# Patient Record
Sex: MALE | Race: WHITE | NOT HISPANIC OR LATINO | Employment: FULL TIME | URBAN - METROPOLITAN AREA
[De-identification: names, ages, dates, MRNs, and addresses within clinical notes are randomized per-mention and may not be internally consistent; named-entity substitution may affect disease eponyms.]

---

## 2020-07-13 ENCOUNTER — OFFICE VISIT (OUTPATIENT)
Dept: FAMILY MEDICINE CLINIC | Facility: CLINIC | Age: 35
End: 2020-07-13

## 2020-07-13 VITALS
OXYGEN SATURATION: 98 % | WEIGHT: 204 LBS | DIASTOLIC BLOOD PRESSURE: 90 MMHG | HEART RATE: 79 BPM | TEMPERATURE: 98 F | HEIGHT: 67 IN | SYSTOLIC BLOOD PRESSURE: 152 MMHG | BODY MASS INDEX: 32.02 KG/M2

## 2020-07-13 DIAGNOSIS — Z00.00 WELLNESS EXAMINATION: Primary | ICD-10-CM

## 2020-07-13 PROCEDURE — 99385 PREV VISIT NEW AGE 18-39: CPT | Performed by: FAMILY MEDICINE

## 2020-07-13 NOTE — PROGRESS NOTES
"Subjective   Pedrito Naylor is a 34 y.o. male and is here for a comprehensive physical exam. The patient reports no problems.    Do you take any herbs or supplements that were not prescribed by a doctor? no  Are you taking calcium supplements? no  Are you taking aspirin daily? no    The following portions of the patient's history were reviewed and updated as appropriate: allergies, current medications, past family history, past medical history, past social history, past surgical history and problem list.    Review of Systems  Do you have pain that bothers you in your daily life? no  Pertinent items are noted in HPI.    Objective   /90 (BP Location: Left arm, Patient Position: Sitting, Cuff Size: Adult)   Pulse 79   Temp 98 °F (36.7 °C)   Ht 168.9 cm (66.5\")   Wt 92.5 kg (204 lb)   SpO2 98%   BMI 32.43 kg/m²   General appearance: alert, appears stated age and cooperative  Head: Normocephalic, without obvious abnormality, atraumatic  Eyes: conjunctivae/corneas clear. PERRL, EOM's intact. Fundi benign.  Ears: normal TM's and external ear canals both ears  Throat: lips, mucosa, and tongue normal; teeth and gums normal  Neck: no adenopathy, supple, symmetrical, trachea midline and thyroid not enlarged, symmetric, no tenderness/mass/nodules  Lungs: clear to auscultation bilaterally  Heart: regular rate and rhythm, S1, S2 normal, no murmur, click, rub or gallop  Abdomen: soft, non-tender; bowel sounds normal; no masses,  no organomegaly  Extremities: extremities normal, atraumatic, no cyanosis or edema  Skin: Skin color, texture, turgor normal. No rashes or lesions  Lymph nodes: Cervical, supraclavicular, and axillary nodes normal.  Neurologic: Grossly normal     No visits with results within 1 Week(s) from this visit.   Latest known visit with results is:   No results found for any previous visit.       Assessment/Plan   Healthy male exam.   There are no diagnoses linked to this encounter.  1. Wellness " exam  2. Patient Counseling:  --Nutrition: Stressed importance of moderation in sodium/caffeine intake, saturated fat and cholesterol, caloric balance, sufficient intake of fresh fruits, vegetables, fiber, calcium, iron, and 1 mg of folate supplement per day (for females capable of pregnancy).  --Exercise: Stressed the importance of regular exercise.   --Dental health: Discussed importance of regular tooth brushing, flossing, and dental visits.  --Immunizations reviewed.  --Discussed benefits of screening colonoscopy.    3. Discussed the patient's BMI with him.  The BMI is above average; BMI management plan is completed  4. Follow up in one year

## 2021-12-22 ENCOUNTER — OFFICE VISIT (OUTPATIENT)
Dept: FAMILY MEDICINE CLINIC | Facility: CLINIC | Age: 36
End: 2021-12-22

## 2021-12-22 VITALS
OXYGEN SATURATION: 98 % | DIASTOLIC BLOOD PRESSURE: 95 MMHG | WEIGHT: 220 LBS | SYSTOLIC BLOOD PRESSURE: 171 MMHG | TEMPERATURE: 97.3 F | BODY MASS INDEX: 34.98 KG/M2 | HEART RATE: 82 BPM

## 2021-12-22 DIAGNOSIS — S16.1XXD STRAIN OF NECK MUSCLE, SUBSEQUENT ENCOUNTER: Primary | ICD-10-CM

## 2021-12-22 DIAGNOSIS — R03.0 ELEVATED BLOOD PRESSURE READING: ICD-10-CM

## 2021-12-22 PROBLEM — S16.1XXA CERVICAL STRAIN: Status: ACTIVE | Noted: 2021-12-22

## 2021-12-22 PROCEDURE — 99213 OFFICE O/P EST LOW 20 MIN: CPT | Performed by: FAMILY MEDICINE

## 2021-12-22 RX ORDER — NAPROXEN 500 MG/1
TABLET ORAL
COMMUNITY
Start: 2021-12-16 | End: 2021-12-30

## 2021-12-22 RX ORDER — METHOCARBAMOL 500 MG/1
TABLET, FILM COATED ORAL
COMMUNITY
Start: 2021-12-16 | End: 2022-04-27

## 2021-12-22 RX ORDER — HYDROCODONE BITARTRATE AND ACETAMINOPHEN 5; 325 MG/1; MG/1
TABLET ORAL
COMMUNITY
Start: 2021-12-16 | End: 2022-04-27

## 2021-12-22 NOTE — ASSESSMENT & PLAN NOTE
Continue with current medications.  If not a lot better in a week then consider PT.  Tylenol 1-2 tabs po q4h prn
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
## 2022-01-24 ENCOUNTER — TREATMENT (OUTPATIENT)
Dept: PHYSICAL THERAPY | Facility: CLINIC | Age: 37
End: 2022-01-24

## 2022-01-24 DIAGNOSIS — M54.50 LOW BACK PAIN, UNSPECIFIED BACK PAIN LATERALITY, UNSPECIFIED CHRONICITY, UNSPECIFIED WHETHER SCIATICA PRESENT: ICD-10-CM

## 2022-01-24 DIAGNOSIS — M54.2 CERVICAL PAIN: ICD-10-CM

## 2022-01-24 DIAGNOSIS — S16.1XXD STRAIN OF NECK MUSCLE, SUBSEQUENT ENCOUNTER: Primary | ICD-10-CM

## 2022-01-24 PROCEDURE — 97162 PT EVAL MOD COMPLEX 30 MIN: CPT | Performed by: PHYSICAL THERAPIST

## 2022-01-24 PROCEDURE — 97110 THERAPEUTIC EXERCISES: CPT | Performed by: PHYSICAL THERAPIST
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
## 2022-01-26 ENCOUNTER — TREATMENT (OUTPATIENT)
Dept: PHYSICAL THERAPY | Facility: CLINIC | Age: 37
End: 2022-01-26

## 2022-01-26 DIAGNOSIS — S16.1XXD STRAIN OF NECK MUSCLE, SUBSEQUENT ENCOUNTER: Primary | ICD-10-CM

## 2022-01-26 DIAGNOSIS — M54.2 CERVICAL PAIN: ICD-10-CM

## 2022-01-26 DIAGNOSIS — M54.50 LOW BACK PAIN, UNSPECIFIED BACK PAIN LATERALITY, UNSPECIFIED CHRONICITY, UNSPECIFIED WHETHER SCIATICA PRESENT: ICD-10-CM

## 2022-01-26 PROCEDURE — 97140 MANUAL THERAPY 1/> REGIONS: CPT | Performed by: PHYSICAL THERAPIST

## 2022-01-26 PROCEDURE — 97110 THERAPEUTIC EXERCISES: CPT | Performed by: PHYSICAL THERAPIST

## 2022-01-26 PROCEDURE — 97530 THERAPEUTIC ACTIVITIES: CPT | Performed by: PHYSICAL THERAPIST

## 2022-01-26 NOTE — PROGRESS NOTES
"Physical Therapy Daily Treatment Note    Patient: Pedrito Naylor  : 1985  Referring Practitioner: Zulma Camacho MD  Date of Initial Visit: Type: THERAPY  Noted: 2022  Today's Date: 2022  Patient seen for: 2 sessions      Visit Diagnoses:     ICD-10-CM ICD-9-CM   1. Strain of neck muscle, subsequent encounter  S16.1XXD V58.89     847.0   2. Cervical pain  M54.2 723.1   3. Low back pain, unspecified back pain laterality, unspecified chronicity, unspecified whether sciatica present  M54.50 724.2       Subjective   Pedrito Naylor reports he is feeling OK, has been working on his stretches since eval. No significant relief to date, but does enjoy the lumbar rotation stretch the most. Does not use ice or heat for pain modulation or muscle relaxation.     Pain Level (0-10): 6/10 neck; 8/10 low back     Objective     See Exercise, Manual, and Modality Logs for complete treatment.     Patient Education: continue with current HEP, educated to use ice/heat as needed for further muscle relaxation with suggestion for ice following work and heat pre work.     Assessment & Plan     Assessment    Assessment details: Pedrito presents to therapy with pain as high as 8/10 in his low back and 6/10 in his neck. Initiated session with pt in prone for STM/MFR to thoracic and lumbar paraspinals, QL and superior gluts. Then positioned in supine for STM/MFR to cervical paraspinals, UT's and LS. Continued with initial HEP for gentle stretching and ROM. Initiated TrA contraction in supine and Sqap squeezes in sitting, not yet added to HEP. Several cues with each ex's for improved form to reduce stress and strain on back. Pt reporting \"muscle soreness\" following, but no increase or reduction in pain.         Progress per Plan of Care           Timed:         Manual Therapy:    24     mins  62028;     Therapeutic Exercise:    15     mins  47909;     Neuromuscular Tripp:        mins  10342;    Therapeutic Activity:     10     " mins  27966;     Gait Training:           mins  31496;     Ultrasound:          mins  11256;    Ionto                                   mins   33493  Self Care                            mins   26163      Un-Timed:  Electrical Stimulation:         mins  55445 ( );  Traction          mins 76878      Timed Treatment:   49   mins   Total Treatment:     49   mins      Jazzmine Hatfield PTA  Physical Therapist Assistant  [place] License: [id number]

## 2022-01-31 ENCOUNTER — TELEPHONE (OUTPATIENT)
Dept: FAMILY MEDICINE CLINIC | Facility: CLINIC | Age: 37
End: 2022-01-31

## 2022-01-31 DIAGNOSIS — G89.29 CHRONIC BILATERAL LOW BACK PAIN WITHOUT SCIATICA: Primary | ICD-10-CM

## 2022-01-31 DIAGNOSIS — M54.50 CHRONIC BILATERAL LOW BACK PAIN WITHOUT SCIATICA: Primary | ICD-10-CM

## 2022-01-31 NOTE — TELEPHONE ENCOUNTER
HE HAD A REFERRAL ON HIS NECK, BUT WAS ALSO WANTING ONE FOR HIS BACK. HE WANTS TO GO TO [facility] [place].

## 2022-02-01 ENCOUNTER — TREATMENT (OUTPATIENT)
Dept: PHYSICAL THERAPY | Facility: CLINIC | Age: 37
End: 2022-02-01

## 2022-02-01 DIAGNOSIS — M54.50 LOW BACK PAIN, UNSPECIFIED BACK PAIN LATERALITY, UNSPECIFIED CHRONICITY, UNSPECIFIED WHETHER SCIATICA PRESENT: ICD-10-CM

## 2022-02-01 DIAGNOSIS — M54.2 CERVICAL PAIN: ICD-10-CM

## 2022-02-01 DIAGNOSIS — S16.1XXD STRAIN OF NECK MUSCLE, SUBSEQUENT ENCOUNTER: Primary | ICD-10-CM

## 2022-02-01 PROCEDURE — 97140 MANUAL THERAPY 1/> REGIONS: CPT | Performed by: PHYSICAL THERAPIST

## 2022-02-01 PROCEDURE — 97530 THERAPEUTIC ACTIVITIES: CPT | Performed by: PHYSICAL THERAPIST

## 2022-02-01 NOTE — PROGRESS NOTES
Physical Therapy Daily Treatment Note    Patient: Pedrito Naylor  : 1985  Referring Practitioner: Zulma Camacho MD  Date of Initial Visit: Type: THERAPY  Noted: 2022  Today's Date: 2022  Patient seen for: 3 sessions      Visit Diagnoses:     ICD-10-CM ICD-9-CM   1. Strain of neck muscle, subsequent encounter  S16.1XXD V58.89     847.0   2. Cervical pain  M54.2 723.1   3. Low back pain, unspecified back pain laterality, unspecified chronicity, unspecified whether sciatica present  M54.50 724.2       Subjective   Pedrito Naylor reports: he is having more back pain than neck today. Neck is more sore, back is painful at 8/10 pain. He has yet to attempt any ice or heat at this point. Does voice that sleep is difficult, due to assisting spouse with  and not necessarily related to his back or neck pain. Stretches seem to be helping for short time.     Pain Level (0-10): 8 low back.     Objective     See Exercise, Manual, and Modality Logs for complete treatment.     Patient Education:figure 4 piriformis stretch in sitting, as well as flexion stretch to perform as able at work.     Assessment & Plan     Assessment    Assessment details: Focused session on low back today due to reports of increased pain in low back v. Soreness in neck. Addition of piriformis stretching and seated fwd flexion stretch to allow Pedrito to perform some HEP throughout his work day.       Progress strengthening /stabilization /functional activity           Timed:         Manual Therapy:    23     mins  91893;     Therapeutic Exercise:         mins  32418;     Neuromuscular Tripp:        mins  84972;    Therapeutic Activity:     15     mins  31465;     Gait Training:           mins  13415;     Ultrasound:          mins  68540;    Ionto                                   mins   28272  Self Care                            mins   84628      Un-Timed:  Electrical Stimulation:         mins  04394 ( );  Traction           mins 49941      Timed Treatment:   38   mins   Total Treatment:     38   mins      Jazzmine Hatfield PTA  Physical Therapist Assistant  [place] License: [id number]

## 2022-02-07 ENCOUNTER — TREATMENT (OUTPATIENT)
Dept: PHYSICAL THERAPY | Facility: CLINIC | Age: 37
End: 2022-02-07

## 2022-02-07 DIAGNOSIS — M54.2 CERVICAL PAIN: ICD-10-CM

## 2022-02-07 DIAGNOSIS — M54.50 LOW BACK PAIN, UNSPECIFIED BACK PAIN LATERALITY, UNSPECIFIED CHRONICITY, UNSPECIFIED WHETHER SCIATICA PRESENT: ICD-10-CM

## 2022-02-07 DIAGNOSIS — S16.1XXD STRAIN OF NECK MUSCLE, SUBSEQUENT ENCOUNTER: Primary | ICD-10-CM

## 2022-02-07 PROCEDURE — 97140 MANUAL THERAPY 1/> REGIONS: CPT | Performed by: PHYSICAL THERAPIST

## 2022-02-07 PROCEDURE — 97530 THERAPEUTIC ACTIVITIES: CPT | Performed by: PHYSICAL THERAPIST
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
## 2022-02-09 ENCOUNTER — TREATMENT (OUTPATIENT)
Dept: PHYSICAL THERAPY | Facility: CLINIC | Age: 37
End: 2022-02-09

## 2022-02-09 DIAGNOSIS — M54.2 CERVICAL PAIN: Primary | ICD-10-CM

## 2022-02-09 DIAGNOSIS — M54.50 LOW BACK PAIN, UNSPECIFIED BACK PAIN LATERALITY, UNSPECIFIED CHRONICITY, UNSPECIFIED WHETHER SCIATICA PRESENT: ICD-10-CM

## 2022-02-09 DIAGNOSIS — S16.1XXD STRAIN OF NECK MUSCLE, SUBSEQUENT ENCOUNTER: ICD-10-CM

## 2022-02-09 PROCEDURE — 97530 THERAPEUTIC ACTIVITIES: CPT | Performed by: PHYSICAL THERAPIST

## 2022-02-09 PROCEDURE — 97140 MANUAL THERAPY 1/> REGIONS: CPT | Performed by: PHYSICAL THERAPIST

## 2022-02-09 NOTE — PROGRESS NOTES
Physical Therapy Daily Treatment Note    Patient: Pedrito Naylor  : 1985  Referring Practitioner: Zulma Camacho MD  Date of Initial Visit: Type: THERAPY  Noted: 2022  Today's Date: 2022  Patient seen for: 5 sessions      Visit Diagnoses:     ICD-10-CM ICD-9-CM   1. Cervical pain  M54.2 723.1   2. Low back pain, unspecified back pain laterality, unspecified chronicity, unspecified whether sciatica present  M54.50 724.2   3. Strain of neck muscle, subsequent encounter  S16.1XXD V58.89     847.0       Subjective   Pedrito Naylor reports he was able to sleep pretty decently last night and is feeling a little better today. Feels as if the heat the other day helped relieve his pain. He did try ice the other day and feels as if heat is a bit better.     Pain Level (0-10): 6 in neck, 7 in low back.     Objective     See Exercise, Manual, and Modality Logs for complete treatment.     Patient Education: when to use heat v ice. Importance of rest for recovery from work and to aide in healing.     Assessment & Plan     Assessment    Assessment details: Pedrito reports to therapy with slight improvement in pain and discomfort, with indications that heat provided relief from pain. Again placed MHP to both neck and back throughout session for muscle relaxation and tissue extensibility when performing ex's. Reports some reduction in pain (5 in neck, 6 in low back). Consider holding manual at next session, progressing through therex first then ending with e-stim.        Progress per Plan of Care           Timed:         Manual Therapy:    15     mins  27790;     Therapeutic Exercise:         mins  34229;     Neuromuscular Tripp:        mins  35738;    Therapeutic Activity:     23     mins  89661;     Gait Training:           mins  67414;     Ultrasound:          mins  97619;    Ionto                                   mins   25891  Self Care                            mins   06886      Un-Timed:  Electrical  Stimulation:         mins  68705 ( );  Traction          mins 45603      Timed Treatment:   38   mins   Total Treatment:     38   mins      Jazzmine Hatfield PTA  Physical Therapist Assistant  [place] License: [id number]

## 2022-02-14 ENCOUNTER — TREATMENT (OUTPATIENT)
Dept: PHYSICAL THERAPY | Facility: CLINIC | Age: 37
End: 2022-02-14

## 2022-02-14 DIAGNOSIS — M54.2 CERVICAL PAIN: Primary | ICD-10-CM

## 2022-02-14 DIAGNOSIS — M54.50 LOW BACK PAIN, UNSPECIFIED BACK PAIN LATERALITY, UNSPECIFIED CHRONICITY, UNSPECIFIED WHETHER SCIATICA PRESENT: ICD-10-CM

## 2022-02-14 DIAGNOSIS — S16.1XXD STRAIN OF NECK MUSCLE, SUBSEQUENT ENCOUNTER: ICD-10-CM

## 2022-02-14 PROCEDURE — 97014 ELECTRIC STIMULATION THERAPY: CPT | Performed by: PHYSICAL THERAPIST

## 2022-02-14 PROCEDURE — 97530 THERAPEUTIC ACTIVITIES: CPT | Performed by: PHYSICAL THERAPIST

## 2022-02-14 PROCEDURE — 97110 THERAPEUTIC EXERCISES: CPT | Performed by: PHYSICAL THERAPIST

## 2022-02-14 NOTE — PROGRESS NOTES
Physical Therapy Daily Treatment Note    Patient: Pedrito Naylor  : 1985  Referring Practitioner: Zulma Camacho MD  Date of Initial Visit: Type: THERAPY  Noted: 2022  Today's Date: 2022  Patient seen for: 6 sessions      Visit Diagnoses:     ICD-10-CM ICD-9-CM   1. Cervical pain  M54.2 723.1   2. Low back pain, unspecified back pain laterality, unspecified chronicity, unspecified whether sciatica present  M54.50 724.2   3. Strain of neck muscle, subsequent encounter  S16.1XXD V58.89     847.0       Subjective   Pedrito Naylor reports he was off over the weekend, so he was able to rest some. His neck seemed to have improved to about 5/10 pain until they went to [place] with his kids. He states the next morning his neck was about 9/10 pain. Low back remains consistent at about 7-8/10.     Pain Level (0-10): 8 in neck and low back    Objective     See Exercise, Manual, and Modality Logs for complete treatment.     EDUCATION: Discussed home E-Stim unit    Assessment & Plan     Assessment    Assessment details: Pedrito reports to therapy with minimal relief in pain or discomfort overall. Manual techs for cervical spine performed, deferred for low back. Continued with therex as per flow sheet. Addition of e-stim to both neck and low back for pain modulation. Pt reporting pain decreased in low back to 6/10 and remains the same in his neck at about 7-8/10. Did enjoy the e-stim and would like to try again at a higher frequency at next session.        Progress per Plan of Care           Timed:         Manual Therapy:    15     mins  24927;     Therapeutic Exercise:         mins  12311;     Neuromuscular Tripp:        mins  58688;    Therapeutic Activity:     15     mins  00209;     Gait Training:           mins  35993;     Ultrasound:          mins  28333;    Ionto                                   mins   46854  Self Care                            mins   11748      Un-Timed:  Electrical  Stimulation:    15     mins  25091 ( );  Traction          mins 63952      Timed Treatment:   30   mins   Total Treatment:     45   mins      Jazzmine Hatfield PTA  Physical Therapist Assistant  [place] License: [id number]

## 2022-02-18 ENCOUNTER — TREATMENT (OUTPATIENT)
Dept: PHYSICAL THERAPY | Facility: CLINIC | Age: 37
End: 2022-02-18

## 2022-02-18 DIAGNOSIS — M54.50 LOW BACK PAIN, UNSPECIFIED BACK PAIN LATERALITY, UNSPECIFIED CHRONICITY, UNSPECIFIED WHETHER SCIATICA PRESENT: ICD-10-CM

## 2022-02-18 DIAGNOSIS — M54.2 CERVICAL PAIN: Primary | ICD-10-CM

## 2022-02-18 DIAGNOSIS — S16.1XXD STRAIN OF NECK MUSCLE, SUBSEQUENT ENCOUNTER: ICD-10-CM

## 2022-02-18 PROCEDURE — 97140 MANUAL THERAPY 1/> REGIONS: CPT | Performed by: PHYSICAL THERAPIST

## 2022-02-18 PROCEDURE — 97014 ELECTRIC STIMULATION THERAPY: CPT | Performed by: PHYSICAL THERAPIST

## 2022-02-18 PROCEDURE — 97530 THERAPEUTIC ACTIVITIES: CPT | Performed by: PHYSICAL THERAPIST

## 2022-02-18 NOTE — PROGRESS NOTES
Physical Therapy Daily Treatment Note    Patient: Pedrito Naylor  : 1985  Referring Practitioner: Zulma Camacho MD  Date of Initial Visit: Type: THERAPY  Noted: 2022  Today's Date: 2022  Patient seen for: 7 sessions      Visit Diagnoses:     ICD-10-CM ICD-9-CM   1. Cervical pain  M54.2 723.1   2. Low back pain, unspecified back pain laterality, unspecified chronicity, unspecified whether sciatica present  M54.50 724.2   3. Strain of neck muscle, subsequent encounter  S16.1XXD V58.89     847.0       Subjective   Pedrito Naylor reports he has been working solo, as his 2 co-workers are currently out. This has increased his general pain. Really enjoyed the e-stim at last session and has looked into a home TENS unit - though has not purchased as of yet.      Pain Level (0-10): 8 in neck and low back    Objective     See Exercise, Manual, and Modality Logs for complete treatment.     EDUCATION: Discussed home E-Stim unit    Assessment & Plan     Assessment    Assessment details: Continued with STM/MFR for muscle relaxation and pain modulation. Trigger points noted in ANITRA QL of low back, otherwise Pedrito reports tenderness in cervicoscapular musculature. Pedrito then performs therex, no cues required from therapist. Ended session with IFC to both neck and low back with MHP. Reports reduction in pain from 8/10 to 4/10 in both areas.        Progress per Plan of Care           Timed:         Manual Therapy:    26     mins  79026;     Therapeutic Exercise:         mins  58098;     Neuromuscular Tripp:        mins  49322;    Therapeutic Activity:     12     mins  87765;     Gait Training:           mins  15609;     Ultrasound:          mins  65744;    Ionto                                   mins   88539  Self Care                            mins   57471      Un-Timed:  Electrical Stimulation:    15     mins  16212 ( );  Traction          mins 19020      Timed Treatment:   38   mins   Total Treatment:      53   mins      Jazzmine Hatfield [facility]  Physical Therapist Assistant  [place] License: [id number]

## 2022-02-21 ENCOUNTER — TREATMENT (OUTPATIENT)
Dept: PHYSICAL THERAPY | Facility: CLINIC | Age: 37
End: 2022-02-21

## 2022-02-21 DIAGNOSIS — M54.2 CERVICAL PAIN: Primary | ICD-10-CM

## 2022-02-21 DIAGNOSIS — S16.1XXD STRAIN OF NECK MUSCLE, SUBSEQUENT ENCOUNTER: ICD-10-CM

## 2022-02-21 DIAGNOSIS — M54.50 LOW BACK PAIN, UNSPECIFIED BACK PAIN LATERALITY, UNSPECIFIED CHRONICITY, UNSPECIFIED WHETHER SCIATICA PRESENT: ICD-10-CM

## 2022-02-21 PROCEDURE — 97014 ELECTRIC STIMULATION THERAPY: CPT | Performed by: PHYSICAL THERAPIST

## 2022-02-21 PROCEDURE — 97140 MANUAL THERAPY 1/> REGIONS: CPT | Performed by: PHYSICAL THERAPIST

## 2022-02-21 PROCEDURE — 97530 THERAPEUTIC ACTIVITIES: CPT | Performed by: PHYSICAL THERAPIST

## 2022-02-21 NOTE — PROGRESS NOTES
Physical Therapy Daily Treatment Note    Patient: Pedrito Naylor  : 1985  Referring Practitioner: Zulma Camacho MD  Date of Initial Visit: Type: THERAPY  Noted: 2022  Today's Date: 2022  Patient seen for: 8 sessions      Visit Diagnoses:     ICD-10-CM ICD-9-CM   1. Cervical pain  M54.2 723.1   2. Low back pain, unspecified back pain laterality, unspecified chronicity, unspecified whether sciatica present  M54.50 724.2   3. Strain of neck muscle, subsequent encounter  S16.1XXD V58.89     847.0       Subjective   Pedrito Naylor reports nothing much has changed. Did have the weekend off and was able to perform some of his exercises. Does really like the ESTIM unit, reports relief for about 30 mins-1 hr.     Pain Level (0-10): 7 in neck and back.     Objective     See Exercise, Manual, and Modality Logs for complete treatment.     Patient Education: muscle tension and guarding in response to injury.    Assessment & Plan     Assessment    Assessment details: Continued with STM and gentle stretching. Addition of SL Open book for stretch of thoracolumbar spine and chest. Reports feeling a good stretch (R>L), and audible pops with first rotation to the L. Reports some improvement with this but nothing significant. Ended session with IFC and heat          Progress per Plan of Care           Timed:         Manual Therapy:    28     mins  44956;     Therapeutic Exercise:         mins  84444;     Neuromuscular Tripp:        mins  49035;    Therapeutic Activity:     10     mins  38940;     Gait Training:           mins  57947;     Ultrasound:          mins  27721;    Ionto                                   mins   64886  Self Care                            mins   64651      Un-Timed:  Electrical Stimulation:    15     mins  70276 ( );  Traction          mins 35290      Timed Treatment:  38    mins   Total Treatment:     53   mins      Jazzmine Hatfield PTA  Physical Therapist Assistant  [place] License:  [id number]

## 2022-02-25 ENCOUNTER — TREATMENT (OUTPATIENT)
Dept: PHYSICAL THERAPY | Facility: CLINIC | Age: 37
End: 2022-02-25

## 2022-02-25 DIAGNOSIS — M54.2 CERVICAL PAIN: Primary | ICD-10-CM

## 2022-02-25 DIAGNOSIS — S16.1XXD STRAIN OF NECK MUSCLE, SUBSEQUENT ENCOUNTER: ICD-10-CM

## 2022-02-25 DIAGNOSIS — M54.50 LOW BACK PAIN, UNSPECIFIED BACK PAIN LATERALITY, UNSPECIFIED CHRONICITY, UNSPECIFIED WHETHER SCIATICA PRESENT: ICD-10-CM

## 2022-02-25 PROCEDURE — 97014 ELECTRIC STIMULATION THERAPY: CPT | Performed by: PHYSICAL THERAPIST

## 2022-02-25 PROCEDURE — 97530 THERAPEUTIC ACTIVITIES: CPT | Performed by: PHYSICAL THERAPIST

## 2022-02-25 PROCEDURE — 97140 MANUAL THERAPY 1/> REGIONS: CPT | Performed by: PHYSICAL THERAPIST

## 2022-02-25 NOTE — PROGRESS NOTES
"Physical Therapy Daily Treatment Note    Patient: Pedrito Naylor  : 1985  Referring Practitioner: Zulma Camacho MD  Date of Initial Visit: Type: THERAPY  Noted: 2022  Today's Date: 2022  Patient seen for: 9 sessions      Visit Diagnoses:     ICD-10-CM ICD-9-CM   1. Cervical pain  M54.2 723.1   2. Low back pain, unspecified back pain laterality, unspecified chronicity, unspecified whether sciatica present  M54.50 724.2   3. Strain of neck muscle, subsequent encounter  S16.1XXD V58.89     847.0       Subjective   Pedrito Naylor reports he is doing pretty well today, attributes this to increased rest over the last several days. He is no longer \"solo\" at work, and has one work partner present, which has also helped.     Pain Level (0-10): 6 in neck and back.     Objective     See Exercise, Manual, and Modality Logs for complete treatment.       Assessment & Plan     Assessment    Assessment details: Pedrito presents to therapy with improving pain this session, attributes this to increased rest. Continued with STM/MFR of lumbar musculature, Noted tissue banding of R QL with reported tenderness/pain in this area. Banding is able to be reduced with STM, but not fully resolved. Also performed STM/MFR to posterior scapular and cervical musculature with less tension noted overall. Pedrito then performs exs prior to application of e-stim for further pain modulation and muscle relaxation. Reporting pain at 5/10 by end of session.          Progress per Plan of Care           Timed:         Manual Therapy:    28     mins  06078;     Therapeutic Exercise:         mins  86062;     Neuromuscular Tripp:        mins  70540;    Therapeutic Activity:     10     mins  96640;     Gait Training:           mins  61982;     Ultrasound:          mins  86445;    Ionto                                   mins   56724  Self Care                            mins   50818      Un-Timed:  Electrical Stimulation:    15     mins  22534 " ( );  Traction          mins 72354      Timed Treatment:  38    mins   Total Treatment:     53   mins      Jazzmine Hatfield PTA  Physical Therapist Assistant  [place] License: [id number]

## 2022-02-28 ENCOUNTER — TREATMENT (OUTPATIENT)
Dept: PHYSICAL THERAPY | Facility: CLINIC | Age: 37
End: 2022-02-28

## 2022-02-28 DIAGNOSIS — S16.1XXD STRAIN OF NECK MUSCLE, SUBSEQUENT ENCOUNTER: ICD-10-CM

## 2022-02-28 DIAGNOSIS — M54.2 CERVICAL PAIN: Primary | ICD-10-CM

## 2022-02-28 DIAGNOSIS — M54.50 LOW BACK PAIN, UNSPECIFIED BACK PAIN LATERALITY, UNSPECIFIED CHRONICITY, UNSPECIFIED WHETHER SCIATICA PRESENT: ICD-10-CM

## 2022-02-28 PROCEDURE — 97014 ELECTRIC STIMULATION THERAPY: CPT | Performed by: PHYSICAL THERAPIST

## 2022-02-28 PROCEDURE — 97140 MANUAL THERAPY 1/> REGIONS: CPT | Performed by: PHYSICAL THERAPIST

## 2022-02-28 PROCEDURE — 97110 THERAPEUTIC EXERCISES: CPT | Performed by: PHYSICAL THERAPIST
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
## 2022-03-01 ENCOUNTER — OFFICE VISIT (OUTPATIENT)
Dept: FAMILY MEDICINE CLINIC | Facility: CLINIC | Age: 37
End: 2022-03-01

## 2022-03-01 VITALS
DIASTOLIC BLOOD PRESSURE: 95 MMHG | HEART RATE: 75 BPM | HEIGHT: 68 IN | TEMPERATURE: 97.5 F | BODY MASS INDEX: 33.16 KG/M2 | OXYGEN SATURATION: 98 % | WEIGHT: 218.8 LBS | SYSTOLIC BLOOD PRESSURE: 141 MMHG

## 2022-03-01 DIAGNOSIS — G89.29 CHRONIC BILATERAL LOW BACK PAIN WITHOUT SCIATICA: Primary | ICD-10-CM

## 2022-03-01 DIAGNOSIS — M54.50 CHRONIC BILATERAL LOW BACK PAIN WITHOUT SCIATICA: Primary | ICD-10-CM

## 2022-03-01 DIAGNOSIS — S16.1XXD STRAIN OF NECK MUSCLE, SUBSEQUENT ENCOUNTER: ICD-10-CM

## 2022-03-01 PROCEDURE — 99213 OFFICE O/P EST LOW 20 MIN: CPT | Performed by: FAMILY MEDICINE

## 2022-03-01 NOTE — PROGRESS NOTES
"Chief Complaint  Neck Pain (follow up)    Subjective          Pedrito Naylor presents to [facility]  He has done PT for about 2 months.  He feels like the therapy has helped a little but he is still having pain.     Neck Pain   This is a chronic problem. The current episode started more than 1 month ago. The pain is present in the occipital region. The quality of the pain is described as aching. The pain is at a severity of 7/10. The symptoms are aggravated by position. Pertinent negatives include no chest pain, fever, headaches, leg pain, numbness, tingling or weakness. He has tried ice, home exercises, heat, NSAIDs and acetaminophen for the symptoms. The treatment provided mild relief.   Back Pain  This is a chronic problem. The current episode started more than 1 month ago. The problem occurs constantly. The problem is unchanged. The pain is present in the lumbar spine. The quality of the pain is described as aching. The pain does not radiate. The pain is at a severity of 7/10. The symptoms are aggravated by position. Pertinent negatives include no chest pain, fever, headaches, leg pain, numbness, tingling or weakness. He has tried NSAIDs, ice, heat and home exercises for the symptoms. The treatment provided mild relief.       Objective   Vital Signs:   /95 (BP Location: Left arm, Patient Position: Sitting, Cuff Size: Large Adult)   Pulse 75   Temp 97.5 °F (36.4 °C)   Ht 172.7 cm (68\")   Wt 99.2 kg (218 lb 12.8 oz)   SpO2 98%   BMI 33.27 kg/m²     Physical Exam  Vitals and nursing note reviewed.   Constitutional:       General: He is not in acute distress.     Appearance: He is well-developed.   HENT:      Head: Normocephalic.   Eyes:      General: Lids are normal.      Conjunctiva/sclera: Conjunctivae normal.   Neck:      Thyroid: No thyroid mass or thyromegaly.      Trachea: Trachea normal.   Cardiovascular:      Rate and Rhythm: Normal rate and regular rhythm.      " Heart sounds: Normal heart sounds.   Pulmonary:      Effort: Pulmonary effort is normal.      Breath sounds: Normal breath sounds.   Musculoskeletal:         General: Tenderness present.      Cervical back: Tenderness present. Decreased range of motion.      Lumbar back: Tenderness present. Decreased range of motion.   Lymphadenopathy:      Cervical: No cervical adenopathy.   Skin:     General: Skin is warm and dry.   Neurological:      General: No focal deficit present.      Mental Status: He is alert and oriented to person, place, and time. Mental status is at baseline.   Psychiatric:         Attention and Perception: He is attentive.         Mood and Affect: Mood normal.         Speech: Speech normal.         Behavior: Behavior normal.        Result Review :   The following data was reviewed by: Zulma Camacho MD on 03/01/2022:                Assessment and Plan    Diagnoses and all orders for this visit:    1. Chronic bilateral low back pain without sciatica (Primary)  Assessment & Plan:  Ibuprofen as needed or Tylenol 1-2 tabs po q4h prn      Orders:  -     MRI Lumbar Spine Without Contrast; Future    2. Strain of neck muscle, subsequent encounter  -     MRI Cervical Spine Without Contrast; Future      Follow Up   Return if symptoms worsen or fail to improve.  Patient was given instructions and counseling regarding his condition or for health maintenance advice. Please see specific information pulled into the AVS if appropriate.

## 2022-03-04 ENCOUNTER — TREATMENT (OUTPATIENT)
Dept: PHYSICAL THERAPY | Facility: CLINIC | Age: 37
End: 2022-03-04

## 2022-03-04 DIAGNOSIS — S16.1XXD STRAIN OF NECK MUSCLE, SUBSEQUENT ENCOUNTER: ICD-10-CM

## 2022-03-04 DIAGNOSIS — M54.50 LOW BACK PAIN, UNSPECIFIED BACK PAIN LATERALITY, UNSPECIFIED CHRONICITY, UNSPECIFIED WHETHER SCIATICA PRESENT: ICD-10-CM

## 2022-03-04 DIAGNOSIS — M54.2 CERVICAL PAIN: Primary | ICD-10-CM

## 2022-03-04 PROCEDURE — 97530 THERAPEUTIC ACTIVITIES: CPT | Performed by: PHYSICAL THERAPIST

## 2022-03-04 PROCEDURE — 97140 MANUAL THERAPY 1/> REGIONS: CPT | Performed by: PHYSICAL THERAPIST

## 2022-03-04 PROCEDURE — 97014 ELECTRIC STIMULATION THERAPY: CPT | Performed by: PHYSICAL THERAPIST

## 2022-03-04 NOTE — PROGRESS NOTES
Physical Therapy Daily Treatment Note    Patient: Pedrito Naylor  : 1985  Referring Practitioner: Zulma Camacho MD  Date of Initial Visit: Type: THERAPY  Noted: 2022  Today's Date: 3/4/2022  Patient seen for: 11 sessions      Visit Diagnoses:     ICD-10-CM ICD-9-CM   1. Cervical pain  M54.2 723.1   2. Low back pain, unspecified back pain laterality, unspecified chronicity, unspecified whether sciatica present  M54.50 724.2   3. Strain of neck muscle, subsequent encounter  S16.1XXD V58.89     847.0       Subjective   Pedrito Naylor reports that this has been a pretty rough week between home and work. He has been working overtime and has not been getting the best of rest at home due to several factors. He reports seeing Dr. Camacho earlier this week, she has ordered MRI's and told him to take PRN Ibuprofen/tylenol. He reports taking ibuprofen 1x/day before work, and not more.     Pain Level (0-10): Neck 7-8/10 in neck and back     Objective     See Exercise, Manual, and Modality Logs for complete treatment.       Education: discussed increasing use of NSAIDs per MD order of 1-2 tabs q4h for the next few days then tapering down as tolerated to see if this will aide in reducing pain cycle.     Assessment & Plan     Assessment    Assessment details: Pedrito continues to present with pain as high as 7-8/10 in low back and neck. Continued with STM/MFR to both areas and ended with e-stim as this provides most relief to patient.       Progress per Plan of Care         Timed:         Manual Therapy:    23     mins  80200;     Therapeutic Exercise:         mins  53725;     Neuromuscular Tripp:        mins  11947;    Therapeutic Activity:     10     mins  39074;     Gait Training:           mins  85373;     Ultrasound:          mins  94479;    Ionto                                   mins   94601  Self Care                            mins   82662      Un-Timed:  Electrical Stimulation:    15     mins  68388 (  );  Traction          mins 29356      Timed Treatment:  33    mins   Total Treatment:     48   mins      Jazzmine Hatfield PTA  Physical Therapist Assistant  [place] License: [id number]

## 2022-03-07 ENCOUNTER — TREATMENT (OUTPATIENT)
Dept: PHYSICAL THERAPY | Facility: CLINIC | Age: 37
End: 2022-03-07

## 2022-03-07 DIAGNOSIS — M54.2 CERVICAL PAIN: Primary | ICD-10-CM

## 2022-03-07 DIAGNOSIS — S16.1XXD STRAIN OF NECK MUSCLE, SUBSEQUENT ENCOUNTER: ICD-10-CM

## 2022-03-07 DIAGNOSIS — M54.50 LOW BACK PAIN, UNSPECIFIED BACK PAIN LATERALITY, UNSPECIFIED CHRONICITY, UNSPECIFIED WHETHER SCIATICA PRESENT: ICD-10-CM

## 2022-03-07 PROCEDURE — 97014 ELECTRIC STIMULATION THERAPY: CPT | Performed by: PHYSICAL THERAPIST

## 2022-03-07 PROCEDURE — 97530 THERAPEUTIC ACTIVITIES: CPT | Performed by: PHYSICAL THERAPIST

## 2022-03-07 PROCEDURE — 97140 MANUAL THERAPY 1/> REGIONS: CPT | Performed by: PHYSICAL THERAPIST

## 2022-03-07 NOTE — PROGRESS NOTES
Physical Therapy Daily Treatment Note    Patient: Pedrito Naylor  : 1985  Referring Practitioner: Zulma Camacho MD  Date of Initial Visit: Type: THERAPY  Noted: 2022  Today's Date: 3/7/2022  Patient seen for: 12 sessions      Visit Diagnoses:     ICD-10-CM ICD-9-CM   1. Cervical pain  M54.2 723.1   2. Low back pain, unspecified back pain laterality, unspecified chronicity, unspecified whether sciatica present  M54.50 724.2   3. Strain of neck muscle, subsequent encounter  S16.1XXD V58.89     847.0       Subjective   Pedrito Naylor reports that working all weekend was pretty hard, he wasn't able to rest as he would have liked, and is experiencing pain around 8/10 in both his neck and back. 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 Has been using NSAID more frequently per MD order. Voices relief of discomfort following each PT session, but only lasts ~1 hr before pain begins to increase.     Pain Level (0-10): Neck 8/10 in neck and back     Objective     See Exercise, Manual, and Modality Logs for complete treatment.       Education: continued use of NSAID's per MD Orders for pain modulation, rest and TENS.      Assessment & Plan     Assessment    Assessment details: Pedrito voices relief following each PT session, but nothing seems to give him long lasting results. STM, ex's and e-stim continued as these do provide positive results here in the clinic. Continued discussion of NSAID use and performing therex as able throughout the day. BRENDA has home TENS unit that pt plans to borrow and bring in for education on at home use.       Progress per Plan of Care         Timed:         Manual Therapy:    28     mins  26435;     Therapeutic Exercise:         mins  71055;     Neuromuscular Tripp:        mins  40899;    Therapeutic Activity:     23     mins  30489;     Gait Training:           mins  60412;      Ultrasound:          mins  07888;    Ionto                                   mins   60441  Self Care                            mins   88116      Un-Timed:  Electrical Stimulation:    15     mins  16256 ( );  Traction          mins 04522      Timed Treatment:  51    mins   Total Treatment:     66   mins      Jazzmine Hatfield PTA  Physical Therapist Assistant  [place] License: [id number]

## 2022-03-11 ENCOUNTER — TREATMENT (OUTPATIENT)
Dept: PHYSICAL THERAPY | Facility: CLINIC | Age: 37
End: 2022-03-11

## 2022-03-11 DIAGNOSIS — S16.1XXD STRAIN OF NECK MUSCLE, SUBSEQUENT ENCOUNTER: ICD-10-CM

## 2022-03-11 DIAGNOSIS — M54.50 LOW BACK PAIN, UNSPECIFIED BACK PAIN LATERALITY, UNSPECIFIED CHRONICITY, UNSPECIFIED WHETHER SCIATICA PRESENT: ICD-10-CM

## 2022-03-11 DIAGNOSIS — M54.2 CERVICAL PAIN: Primary | ICD-10-CM

## 2022-03-11 PROCEDURE — 97140 MANUAL THERAPY 1/> REGIONS: CPT | Performed by: PHYSICAL THERAPIST

## 2022-03-11 PROCEDURE — 97530 THERAPEUTIC ACTIVITIES: CPT | Performed by: PHYSICAL THERAPIST

## 2022-03-11 PROCEDURE — 97014 ELECTRIC STIMULATION THERAPY: CPT | Performed by: PHYSICAL THERAPIST

## 2022-03-11 NOTE — PROGRESS NOTES
Physical Therapy Daily Treatment Note    Patient: Pedrito Naylor  : 1985  Referring Practitioner: Zulma Camacho MD  Date of Initial Visit: Type: THERAPY  Noted: 2022  Today's Date: 3/11/2022  Patient seen for: 13 sessions      Visit Diagnoses:     ICD-10-CM ICD-9-CM   1. Cervical pain  M54.2 723.1   2. Low back pain, unspecified back pain laterality, unspecified chronicity, unspecified whether sciatica present  M54.50 724.2   3. Strain of neck muscle, subsequent encounter  S16.1XXD V58.89     847.0       Subjective   Pedrito Naylor reports that his back is not as bad as his neck, but he does remain pretty painful. Had to work last weekend and all week. Has had decent rest this weekend. Is taking NSAID every 4 hours as prescribed, but has not been able to taper down. Pain is worse on first wake and at end of his shift. Has MRI Scheduled on 3/30    Pain Level (0-10): Neck 7/10 in neck and 6/10 back     Objective     See Exercise, Manual, and Modality Logs for complete treatment.       Assessment & Plan     Assessment    Assessment details: Pedrito continues to have pain with minimal noted relief between sessions. Due to this, he has not been able to progress with therex further than gentle stretching. STM and e-stim continued as these do provide positive results here in the clinic. He demonstrates limited thoracic and scapular mobility, thus performed open book/bow & arrow to improve. Consider d/c of manual for several sessions with focus on mobility to see if this makes improvements.       Progress per Plan of Care         Timed:         Manual Therapy:    28     mins  96555;     Therapeutic Exercise:         mins  68949;     Neuromuscular Tripp:        mins  72820;    Therapeutic Activity:     10     mins  32106;     Gait Training:           mins  24999;     Ultrasound:          mins  29668;    Ionto                                   mins   80196  Self Care                            mins    90143      Un-Timed:  Electrical Stimulation:    15     mins  98658 ( );  Traction          mins 14093      Timed Treatment:  38    mins   Total Treatment:     53   mins      Jazzmine Hatfield PTA  Physical Therapist Assistant  [place] License: [id number]

## 2022-03-14 ENCOUNTER — TREATMENT (OUTPATIENT)
Dept: PHYSICAL THERAPY | Facility: CLINIC | Age: 37
End: 2022-03-14

## 2022-03-14 DIAGNOSIS — S16.1XXD STRAIN OF NECK MUSCLE, SUBSEQUENT ENCOUNTER: ICD-10-CM

## 2022-03-14 DIAGNOSIS — M54.50 LOW BACK PAIN, UNSPECIFIED BACK PAIN LATERALITY, UNSPECIFIED CHRONICITY, UNSPECIFIED WHETHER SCIATICA PRESENT: ICD-10-CM

## 2022-03-14 DIAGNOSIS — M54.2 CERVICAL PAIN: Primary | ICD-10-CM

## 2022-03-14 PROCEDURE — 97530 THERAPEUTIC ACTIVITIES: CPT | Performed by: PHYSICAL THERAPIST

## 2022-03-14 PROCEDURE — 97014 ELECTRIC STIMULATION THERAPY: CPT | Performed by: PHYSICAL THERAPIST

## 2022-03-14 PROCEDURE — 97110 THERAPEUTIC EXERCISES: CPT | Performed by: PHYSICAL THERAPIST

## 2022-03-14 NOTE — PROGRESS NOTES
Physical Therapy Daily Treatment Note    Patient: Pedrito Naylor  : 1985  Referring Practitioner: Zulma Camacho MD  Date of Initial Visit: Type: THERAPY  Noted: 2022  Today's Date: 3/14/2022  Patient seen for: 14 sessions      Visit Diagnoses:     ICD-10-CM ICD-9-CM   1. Cervical pain  M54.2 723.1   2. Low back pain, unspecified back pain laterality, unspecified chronicity, unspecified whether sciatica present  M54.50 724.2   3. Strain of neck muscle, subsequent encounter  S16.1XXD V58.89     847.0       Subjective   Pedrito Naylor reports that he was off work this weekend and was able to enjoy some leisure activities with his family. Attended [employer] and sat in bleacher style seating for some time. His back is a bit worse than his neck today (pain rating below).     Pain Level (0-10): Neck 5/10 in neck and 8/10 back     Objective     Observation in unsupported sitting is excessively rounded shoulders with increased kyphosis of thoracic spine, reduced lordosis of lumbar spine and significantly fwd flexed position of c-spine.    See Exercise, Manual, and Modality Logs for complete treatment.       Assessment & Plan     Assessment    Assessment details: Opted to hold manual techniques this session so pt can work through ex's for stretching/strengthening, to end with IFC/Heat;  Pt agreeable to plan. Performed current therex with additions this session of light T-Band shoulder and core strengthening. Pedrito requires significant instruction and cueing for posture and form during seated ex's for optimal strengthening. He reports reduction of pain in low back to 6/10 at end of session, but neck pain remains about the same.       Progress per Plan of Care         Timed:         Manual Therapy:         mins  91713;     Therapeutic Exercise:    23     mins  48002;     Neuromuscular Tripp:        mins  03396;    Therapeutic Activity:     15     mins  60825;     Gait Training:           mins  29132;      Ultrasound:          mins  69134;    Ionto                                   mins   63073  Self Care                            mins   78858      Un-Timed:  Electrical Stimulation:    15     mins  42171 ( );  Traction          mins 83153      Timed Treatment:  38    mins   Total Treatment:     53   mins      Jazzmine Hatfield PTA  Physical Therapist Assistant  [place] License: [id number]

## 2022-03-18 ENCOUNTER — TREATMENT (OUTPATIENT)
Dept: PHYSICAL THERAPY | Facility: CLINIC | Age: 37
End: 2022-03-18

## 2022-03-18 DIAGNOSIS — M54.50 LOW BACK PAIN, UNSPECIFIED BACK PAIN LATERALITY, UNSPECIFIED CHRONICITY, UNSPECIFIED WHETHER SCIATICA PRESENT: ICD-10-CM

## 2022-03-18 DIAGNOSIS — M54.2 CERVICAL PAIN: Primary | ICD-10-CM

## 2022-03-18 DIAGNOSIS — S16.1XXD STRAIN OF NECK MUSCLE, SUBSEQUENT ENCOUNTER: ICD-10-CM

## 2022-03-18 PROCEDURE — 97110 THERAPEUTIC EXERCISES: CPT | Performed by: PHYSICAL THERAPIST

## 2022-03-18 PROCEDURE — 97530 THERAPEUTIC ACTIVITIES: CPT | Performed by: PHYSICAL THERAPIST

## 2022-03-18 PROCEDURE — 97014 ELECTRIC STIMULATION THERAPY: CPT | Performed by: PHYSICAL THERAPIST

## 2022-03-18 NOTE — PROGRESS NOTES
Physical Therapy Daily Treatment Note    Patient: Pedrito Naylor  : 1985  Referring Practitioner: Zulma Camacho MD  Date of Initial Visit: Type: THERAPY  Noted: 2022  Today's Date: 3/18/2022  Patient seen for: 15 sessions      Visit Diagnoses:     ICD-10-CM ICD-9-CM   1. Cervical pain  M54.2 723.1   2. Low back pain, unspecified back pain laterality, unspecified chronicity, unspecified whether sciatica present  M54.50 724.2   3. Strain of neck muscle, subsequent encounter  S16.1XXD V58.89     847.0       Subjective   Pedrito Naylor reports: his pain remains between 6-8 in both his neck and low back. Voices some frustration that PT does seem to help for limited amount of time, but nothing seems to be long lasting. He is continuing to to take NSAIDs as prescribed q 4 hrs PRN. On a positive note, he does report that it is a bit easier to move. Feels as if the therex focused session was helpful earlier this week.      Pain Level (0-10): 7 in neck, 8 in back.     Objective     See Exercise, Manual, and Modality Logs for complete treatment.     Patient Education: Contacting MD and making appointment for MRI follow up after 3/30; possibly sooner if pain persists at 6-8 with no relief.     Assessment & Plan     Assessment    Assessment details: Continued this session with focus on movement. Prioritized gentle ROM and stretching of both back and neck with addition of light strengthening. Significant cueing for correct with TrA bracing/PPT and constant cues for postural awareness with all sitting ex's. Ended with IFC and heat, reporting relief in both neck and back- rating as follows: Neck 4/10, back 6/10      Progress per Plan of Care           Timed:         Manual Therapy:         mins  73936;     Therapeutic Exercise:    25     mins  04850;     Neuromuscular Tripp:        mins  36923;    Therapeutic Activity:     10     mins  79191;     Gait Training:           mins  04033;     Ultrasound:          mins   06657;    Ionto                                   mins   95000  Self Care                            mins   06846      Un-Timed:  Electrical Stimulation:    15     mins  27484 ( );  Traction          mins 55998      Timed Treatment:   35   mins   Total Treatment:     50   mins      Jazzmine Hatfield PTA  Physical Therapist Assistant  [place] License: [id number]

## 2022-03-21 ENCOUNTER — TREATMENT (OUTPATIENT)
Dept: PHYSICAL THERAPY | Facility: CLINIC | Age: 37
End: 2022-03-21

## 2022-03-21 DIAGNOSIS — M54.50 LOW BACK PAIN, UNSPECIFIED BACK PAIN LATERALITY, UNSPECIFIED CHRONICITY, UNSPECIFIED WHETHER SCIATICA PRESENT: ICD-10-CM

## 2022-03-21 DIAGNOSIS — S16.1XXD STRAIN OF NECK MUSCLE, SUBSEQUENT ENCOUNTER: ICD-10-CM

## 2022-03-21 DIAGNOSIS — M54.2 CERVICAL PAIN: Primary | ICD-10-CM

## 2022-03-21 PROCEDURE — 97530 THERAPEUTIC ACTIVITIES: CPT | Performed by: PHYSICAL THERAPIST

## 2022-03-21 PROCEDURE — 97014 ELECTRIC STIMULATION THERAPY: CPT | Performed by: PHYSICAL THERAPIST

## 2022-03-21 PROCEDURE — 97110 THERAPEUTIC EXERCISES: CPT | Performed by: PHYSICAL THERAPIST

## 2022-03-21 NOTE — PROGRESS NOTES
Physical Therapy Daily Treatment Note    Patient: Pedrito Naylor  : 1985  Referring Practitioner: Zulma Camacho MD  Date of Initial Visit: Type: THERAPY  Noted: 2022  Today's Date: 3/21/2022  Patient seen for: 16 sessions      Visit Diagnoses:     ICD-10-CM ICD-9-CM   1. Cervical pain  M54.2 723.1   2. Low back pain, unspecified back pain laterality, unspecified chronicity, unspecified whether sciatica present  M54.50 724.2   3. 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 Pain has reduced in his neck, but not in his low back.     Pain Level (0-10): 4 in neck, 7 low back     Objective     See Exercise, Manual, and Modality Logs for complete treatment.     Education: HEP updated with addition of Hip/Core strengthening and GTB given for anti-rotation ex's to perform at home     Assessment & Plan     Assessment    Assessment details: Focused this session on core engagement and strengthening as pt is reporting low levels of neck pain. Pedrito continues to have difficulty with abdominal engagement and dynamic mm coordination. Progressed as able with several verbal, tactile and visual cues. Ended session with IFC and heat to low back for pain modulation.       Progress per Plan of Care           Timed:         Manual Therapy:         mins  01315;     Therapeutic Exercise:    23     mins  11624;     Neuromuscular Tripp:        mins  55884;    Therapeutic Activity:     15     mins  07875;     Gait Training:           mins  53317;     Ultrasound:          mins  31025;    Ionto                                   mins   11635  Self Care                            mins   47096      Un-Timed:  Electrical Stimulation:    15     mins  39292 (MC );  Traction          mins 01813      Timed Treatment:   38   mins    Total Treatment:     53   mins      Jazzmine Hatfield PTA  Physical Therapist Assistant  [place] License: [id number]

## 2022-03-25 ENCOUNTER — TREATMENT (OUTPATIENT)
Dept: PHYSICAL THERAPY | Facility: CLINIC | Age: 37
End: 2022-03-25

## 2022-03-25 DIAGNOSIS — M54.2 CERVICAL PAIN: Primary | ICD-10-CM

## 2022-03-25 DIAGNOSIS — S16.1XXD STRAIN OF NECK MUSCLE, SUBSEQUENT ENCOUNTER: ICD-10-CM

## 2022-03-25 DIAGNOSIS — M54.50 LOW BACK PAIN, UNSPECIFIED BACK PAIN LATERALITY, UNSPECIFIED CHRONICITY, UNSPECIFIED WHETHER SCIATICA PRESENT: ICD-10-CM

## 2022-03-25 PROCEDURE — 97140 MANUAL THERAPY 1/> REGIONS: CPT | Performed by: PHYSICAL THERAPIST

## 2022-03-25 PROCEDURE — 97530 THERAPEUTIC ACTIVITIES: CPT | Performed by: PHYSICAL THERAPIST

## 2022-03-25 PROCEDURE — 97014 ELECTRIC STIMULATION THERAPY: CPT | Performed by: PHYSICAL THERAPIST

## 2022-03-25 NOTE — PROGRESS NOTES
Physical Therapy Daily Treatment Note    Patient: Pedrito Naylor  : 1985  Referring Practitioner: Zulma Camacho MD  Date of Initial Visit: Type: THERAPY  Noted: 2022  Today's Date: 3/25/2022  Patient seen for: 17 sessions      Visit Diagnoses:     ICD-10-CM ICD-9-CM   1. Cervical pain  M54.2 723.1   2. Low back pain, unspecified back pain laterality, unspecified chronicity, unspecified whether sciatica present  M54.50 724.2   3. Strain of neck muscle, subsequent encounter  S16.1XXD V58.89     847.0       Subjective   Pedrito Naylor reports: that his back has been hurting him pretty badly. Has been having sharp pains shooting down the R side since Tuesday. Noticed it at first when attempting to get up off of the floor from playing with his son. Reports that he did perform some stretches and ex's as well as heat with slight relief, but nothing significant. Currently neck pain has improved, back pain 9/10.     Pain Level (0-10): 9    Objective     See Exercise, Manual, and Modality Logs for complete treatment.     Patient Education: Defer strengthening at this time due to flair up of pain with radicular symptoms on R low back to leg. Is to focus on rest and stretching until next session.      Assessment & Plan     Assessment    Assessment details: Due to flair of pain and radicular symptoms STM/MFR resumed for R low back, glut, piriformis. Followed by gentle stretching and sciatic nerve glide- more time and cueing needed for additional ex's. Ended with IFC and heat with focus to R lumbosacral area in L sidelying. Reports improvement following, but not better than 7/10.           Progress per Plan of Care           Timed:         Manual Therapy:    23     mins  03406;     Therapeutic Exercise:         mins  23451;     Neuromuscular Tripp:        mins  59418;    Therapeutic Activity:     18     mins  34431;     Gait Training:           mins  34049;     Ultrasound:          mins  69180;    Ionto                                    mins   80558  Self Care                            mins   31569      Un-Timed:  Electrical Stimulation:    15     mins  22265 ( );  Traction          mins 51802      Timed Treatment:   41   mins   Total Treatment:     56   mins      Jazzmine Hatfield PTA  Physical Therapist Assistant  [place] License: [id number]

## 2022-03-28 ENCOUNTER — TREATMENT (OUTPATIENT)
Dept: PHYSICAL THERAPY | Facility: CLINIC | Age: 37
End: 2022-03-28

## 2022-03-28 DIAGNOSIS — M54.50 LOW BACK PAIN, UNSPECIFIED BACK PAIN LATERALITY, UNSPECIFIED CHRONICITY, UNSPECIFIED WHETHER SCIATICA PRESENT: ICD-10-CM

## 2022-03-28 DIAGNOSIS — S16.1XXD STRAIN OF NECK MUSCLE, SUBSEQUENT ENCOUNTER: ICD-10-CM

## 2022-03-28 DIAGNOSIS — M54.2 CERVICAL PAIN: Primary | ICD-10-CM

## 2022-03-28 PROCEDURE — 97140 MANUAL THERAPY 1/> REGIONS: CPT | Performed by: PHYSICAL THERAPIST

## 2022-03-28 PROCEDURE — 97014 ELECTRIC STIMULATION THERAPY: CPT | Performed by: PHYSICAL THERAPIST

## 2022-03-28 PROCEDURE — 97530 THERAPEUTIC ACTIVITIES: CPT | Performed by: PHYSICAL THERAPIST
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
## 2022-03-30 ENCOUNTER — APPOINTMENT (OUTPATIENT)
Dept: MRI IMAGING | Facility: HOSPITAL | Age: 37
End: 2022-03-30

## 2022-04-01 ENCOUNTER — TREATMENT (OUTPATIENT)
Dept: PHYSICAL THERAPY | Facility: CLINIC | Age: 37
End: 2022-04-01

## 2022-04-01 DIAGNOSIS — S16.1XXD STRAIN OF NECK MUSCLE, SUBSEQUENT ENCOUNTER: ICD-10-CM

## 2022-04-01 DIAGNOSIS — M54.2 CERVICAL PAIN: Primary | ICD-10-CM

## 2022-04-01 DIAGNOSIS — M54.50 LOW BACK PAIN, UNSPECIFIED BACK PAIN LATERALITY, UNSPECIFIED CHRONICITY, UNSPECIFIED WHETHER SCIATICA PRESENT: ICD-10-CM

## 2022-04-01 PROCEDURE — 97110 THERAPEUTIC EXERCISES: CPT | Performed by: PHYSICAL THERAPIST

## 2022-04-01 PROCEDURE — 97014 ELECTRIC STIMULATION THERAPY: CPT | Performed by: PHYSICAL THERAPIST

## 2022-04-01 PROCEDURE — 97140 MANUAL THERAPY 1/> REGIONS: CPT | Performed by: PHYSICAL THERAPIST

## 2022-04-01 NOTE — PROGRESS NOTES
Physical Therapy Daily Treatment Note    Patient: Pedrito Naylor  : 1985  Referring Practitioner: Zulma Camacho MD  Date of Initial Visit: Type: THERAPY  Noted: 2022  Today's Date: 2022  Patient seen for: 19 sessions      Visit Diagnoses:     ICD-10-CM ICD-9-CM   1. Cervical pain  M54.2 723.1   2. Strain of neck muscle, subsequent encounter  S16.1XXD V58.89     847.0   3. Low back pain, unspecified back pain laterality, unspecified chronicity, unspecified whether sciatica present  M54.50 724.2       Subjective   Pedrito Naylor reports that he is very frustrated with [facility] in regards to his scheduled MRI that was cancelled last minute. Feels as if he was given the run around because he was first told the Radiologist was not available to read the results, then it was that he didn't have Insurance Auth. He has reached out to his PCP regarding this and is still waiting on a resolution. He expresses frustration with the central scheduling hub when he reached out to the PCP office as well.      In regards to his pain, his low back remains unchanged, his neck continues to do well.     Pain Level (0-10): 8 low back, neck 1-2    Objective     See Exercise, Manual, and Modality Logs for complete treatment.     Patient Education: How to get through    Assessment & Plan     Assessment    Assessment details: Continued with STM/MFR and gentle stretches. IFC is reported to be the most beneficial for for pain relief. Thus ended with heat and IFC. Pain reported at 6/10 at end of session.       Progress per Plan of Care           Timed:         Manual Therapy:    10     mins  99889;     Therapeutic Exercise:    15     mins  62338;     Neuromuscular Tripp:        mins  97407;    Therapeutic Activity:          mins  56128;     Gait Training:           mins  80232;     Ultrasound:          mins  51092;    Ionto                                   mins   72482  Self Care                            mins    38688      Un-Timed:  Electrical Stimulation:    15     mins  62167 ( );  Traction          mins 62400      Timed Treatment:  35    mins   Total Treatment:     40   mins      Jazzmine Hatfield PTA  Physical Therapist Assistant  [place] License: [id number]

## 2022-04-04 ENCOUNTER — TREATMENT (OUTPATIENT)
Dept: PHYSICAL THERAPY | Facility: CLINIC | Age: 37
End: 2022-04-04

## 2022-04-04 DIAGNOSIS — S16.1XXD STRAIN OF NECK MUSCLE, SUBSEQUENT ENCOUNTER: ICD-10-CM

## 2022-04-04 DIAGNOSIS — M54.2 CERVICAL PAIN: Primary | ICD-10-CM

## 2022-04-04 DIAGNOSIS — M54.50 LOW BACK PAIN, UNSPECIFIED BACK PAIN LATERALITY, UNSPECIFIED CHRONICITY, UNSPECIFIED WHETHER SCIATICA PRESENT: ICD-10-CM

## 2022-04-04 PROCEDURE — 97014 ELECTRIC STIMULATION THERAPY: CPT | Performed by: PHYSICAL THERAPIST

## 2022-04-04 PROCEDURE — 97110 THERAPEUTIC EXERCISES: CPT | Performed by: PHYSICAL THERAPIST

## 2022-04-04 PROCEDURE — 97530 THERAPEUTIC ACTIVITIES: CPT | Performed by: PHYSICAL THERAPIST

## 2022-04-04 PROCEDURE — 97112 NEUROMUSCULAR REEDUCATION: CPT | Performed by: PHYSICAL THERAPIST

## 2022-04-04 NOTE — PROGRESS NOTES
Physical Therapy Daily Treatment Note    Patient: Pedrito Naylor  : 1985  Referring Practitioner: Zulma Camacho MD  Date of Initial Visit: Type: THERAPY  Noted: 2022  Today's Date: 2022  Patient seen for: 20 sessions      Visit Diagnoses:     ICD-10-CM ICD-9-CM   1. Cervical pain  M54.2 723.1   2. Strain of neck muscle, subsequent encounter  S16.1XXD V58.89     847.0   3. Low back pain, unspecified back pain laterality, unspecified chronicity, unspecified whether sciatica present  M54.50 724.2       Subjective   Pedrito Naylor reports that he is waiting on a call back from Dr. Camacho's office to determine if he is able to get an earlier MRI scheduled at either [facility] or [facility]. His neck continues to do well with minimal pain at this point, his back seems to remain in constant pain. Was mostly a 6/10 over the weekend, but is a 7/10 this morning due to lack of rest overnight.     Pain Level (0-10): 7/10 low back.     Objective     See Exercise, Manual, and Modality Logs for complete treatment.     Assessment & Plan     Assessment    Assessment details: Continued to progress through stretching and strengthening ex's for low back pain and discomfort. Continues to struggle with motor coordination and control of PPT/TrA contraction. Requires several cues for form. Ended with IFC and Heat with reported reduction in pain, but does not rate.       Progress per Plan of Care           Timed:         Manual Therapy:         mins  18502;     Therapeutic Exercise:    15     mins  92370;     Neuromuscular Tripp:    8    mins  24106;    Therapeutic Activity:     15     mins  78575;     Gait Training:           mins  25451;     Ultrasound:          mins  35292;    Ionto                                   mins   35474  Self Care                            mins   04684      Un-Timed:  Electrical Stimulation:    15     mins  49747 (MC );  Traction          mins 56028      Timed Treatment:  38    mins   Total  Treatment:     53   mins      Jazzmine Hatfield PTA  Physical Therapist Assistant  [place] License: [id number]

## 2022-04-08 ENCOUNTER — TREATMENT (OUTPATIENT)
Dept: PHYSICAL THERAPY | Facility: CLINIC | Age: 37
End: 2022-04-08

## 2022-04-08 DIAGNOSIS — M54.2 CERVICAL PAIN: ICD-10-CM

## 2022-04-08 DIAGNOSIS — S16.1XXD STRAIN OF NECK MUSCLE, SUBSEQUENT ENCOUNTER: ICD-10-CM

## 2022-04-08 DIAGNOSIS — M54.50 LOW BACK PAIN, UNSPECIFIED BACK PAIN LATERALITY, UNSPECIFIED CHRONICITY, UNSPECIFIED WHETHER SCIATICA PRESENT: Primary | ICD-10-CM

## 2022-04-08 PROCEDURE — 97530 THERAPEUTIC ACTIVITIES: CPT | Performed by: PHYSICAL THERAPIST

## 2022-04-08 PROCEDURE — 97014 ELECTRIC STIMULATION THERAPY: CPT | Performed by: PHYSICAL THERAPIST

## 2022-04-08 PROCEDURE — 97110 THERAPEUTIC EXERCISES: CPT | Performed by: PHYSICAL THERAPIST

## 2022-04-08 NOTE — PROGRESS NOTES
Physical Therapy Daily Treatment Note    Patient: Pedrito Naylor  : 1985  Referring Practitioner: Zulma Camacho MD  Date of Initial Visit: Type: THERAPY  Noted: 2022  Today's Date: 2022  Patient seen for: 21 sessions      Visit Diagnoses:     ICD-10-CM ICD-9-CM   1. Low back pain, unspecified back pain laterality, unspecified chronicity, unspecified whether sciatica present  M54.50 724.2       Subjective   Pedrito Naylor reports that he is still waiting on information regarding his ordered MRI's, he is hoping this will be resolved soon and he can get in for them. He is off all weekend and is hopeful to get some rest as well.     Pain Level (0-10): 7/10 low back.     Objective     See Exercise, Manual, and Modality Logs for complete treatment.     Assessment & Plan     Assessment    Assessment details: Pedrito presents to therapy with no reports of significant change or improvement in his low back pain. Neck pain remains low and not an issue at this time. Reports pain seems to be consistent on the R side in the area of QL, superior glut. Worked through therex as per flow sheet with addition of QP thread the needle into open book to encourage thoracic rotation with lumbar stretch to chest stretch. Also added mitali pose with slight left lateral reach, again to open R lateral spine and allow for lower lumbar stretch. Ended with IFC and heat as this seems to provide the most relief.       Progress per Plan of Care           Timed:         Manual Therapy:         mins  69290;     Therapeutic Exercise:    15     mins  63165;     Neuromuscular Tripp:        mins  58086;    Therapeutic Activity:     23     mins  26319;     Gait Training:           mins  46183;     Ultrasound:          mins  88703;    Ionto                                   mins   52619  Self Care                            mins   62908      Un-Timed:  Electrical Stimulation:    15     mins  72950 ( );  Traction          mins  02262      Timed Treatment:  38    mins   Total Treatment:     55   mins      Jazzmine Hatfield PTA  Physical Therapist Assistant  [place] License: [id number]

## 2022-04-11 ENCOUNTER — TREATMENT (OUTPATIENT)
Dept: PHYSICAL THERAPY | Facility: CLINIC | Age: 37
End: 2022-04-11

## 2022-04-11 DIAGNOSIS — S16.1XXD STRAIN OF NECK MUSCLE, SUBSEQUENT ENCOUNTER: ICD-10-CM

## 2022-04-11 DIAGNOSIS — M54.50 LOW BACK PAIN, UNSPECIFIED BACK PAIN LATERALITY, UNSPECIFIED CHRONICITY, UNSPECIFIED WHETHER SCIATICA PRESENT: Primary | ICD-10-CM

## 2022-04-11 DIAGNOSIS — M54.2 CERVICAL PAIN: ICD-10-CM

## 2022-04-11 PROCEDURE — 97530 THERAPEUTIC ACTIVITIES: CPT | Performed by: PHYSICAL THERAPIST

## 2022-04-11 PROCEDURE — 97110 THERAPEUTIC EXERCISES: CPT | Performed by: PHYSICAL THERAPIST

## 2022-04-11 PROCEDURE — 97014 ELECTRIC STIMULATION THERAPY: CPT | Performed by: PHYSICAL THERAPIST

## 2022-04-11 NOTE — PROGRESS NOTES
Physical Therapy Daily Treatment Note    Patient: Pedrito Naylor  : 1985  Referring Practitioner: Zulma Camacho MD  Date of Initial Visit: Type: THERAPY  Noted: 2022  Today's Date: 2022  Patient seen for: 22 sessions      Visit Diagnoses:     ICD-10-CM ICD-9-CM   1. Low back pain, unspecified back pain laterality, unspecified chronicity, unspecified whether sciatica present  M54.50 724.2   2. Cervical pain  M54.2 723.1   3. Strain of neck muscle, subsequent encounter  S16.1XXD V58.89     847.0       Subjective   Pedrito Naylor reports: that he was able to get out this weekend for shopping and spent most of the day yesterday outside with his kids. Neck pain remains low and not an issue, back pain remains moderate at 6-8/10. He is still utilizing oral NSAIDS Q4 per MD orders at last appointment, and has incorporated a hot pack as part of his nightly routine, which seems to have helped.     Pain Level (0-10): 6    Objective     See Exercise, Manual, and Modality Logs for complete treatment.     Patient Education: Discussed progress in PT, and need to increase strengthening exercises. Exercise mechanics.      Assessment & Plan     Assessment    Assessment details: Pedrito presented to therapy this date with continued complaints of of low back pain. Discussed progress in PT and the need to increase strengthening exercise. Spent time to educate proper form with PPT exercise. Visual and tactile cues given to keep correct form. Continued current exercises to encourage strength of lumbar stabilizers. Ended session with MHP and estim per patient preference.           Progress per Plan of Care and Progress strengthening /stabilization /functional activity -    See x 2 more sessions, 30 Day progress note on . Possible continuation v. return to MD for further evaluation v. DC due to lack of progress/conservation of insurance therapy coverage.            Timed:         Manual Therapy:         mins   90822;     Therapeutic Exercise:    15     mins  22560;     Neuromuscular Tripp:        mins  34806;    Therapeutic Activity:     20     mins  23814;     Gait Training:           mins  60292;     Ultrasound:          mins  90461;    Ionto                                   mins   79735  Self Care                            mins   05035      Un-Timed:  Electrical Stimulation:    15     mins  51598 ( );  Traction          mins 96587      Timed Treatment:   35   mins   Total Treatment:     50   mins      Jazzmine Hatfield PTA  Physical Therapist Assistant  [place] License: [id number]

## 2022-04-14 ENCOUNTER — TELEPHONE (OUTPATIENT)
Dept: FAMILY MEDICINE CLINIC | Facility: CLINIC | Age: 37
End: 2022-04-14

## 2022-04-14 DIAGNOSIS — G89.29 CHRONIC BILATERAL LOW BACK PAIN WITHOUT SCIATICA: Primary | ICD-10-CM

## 2022-04-14 DIAGNOSIS — M54.50 CHRONIC BILATERAL LOW BACK PAIN WITHOUT SCIATICA: Primary | ICD-10-CM

## 2022-04-15 ENCOUNTER — TREATMENT (OUTPATIENT)
Dept: PHYSICAL THERAPY | Facility: CLINIC | Age: 37
End: 2022-04-15

## 2022-04-15 DIAGNOSIS — M54.50 LOW BACK PAIN, UNSPECIFIED BACK PAIN LATERALITY, UNSPECIFIED CHRONICITY, UNSPECIFIED WHETHER SCIATICA PRESENT: Primary | ICD-10-CM

## 2022-04-15 DIAGNOSIS — S16.1XXD STRAIN OF NECK MUSCLE, SUBSEQUENT ENCOUNTER: ICD-10-CM

## 2022-04-15 DIAGNOSIS — M54.2 CERVICAL PAIN: ICD-10-CM

## 2022-04-15 PROCEDURE — 97530 THERAPEUTIC ACTIVITIES: CPT | Performed by: PHYSICAL THERAPIST

## 2022-04-15 PROCEDURE — 97014 ELECTRIC STIMULATION THERAPY: CPT | Performed by: PHYSICAL THERAPIST

## 2022-04-15 PROCEDURE — 97110 THERAPEUTIC EXERCISES: CPT | Performed by: PHYSICAL THERAPIST

## 2022-04-15 NOTE — PROGRESS NOTES
Physical Therapy Daily Treatment Note    Patient: Pedrito Naylor  : 1985  Referring Practitioner: Zulma Camacho MD  Date of Initial Visit: Type: THERAPY  Noted: 2022  Today's Date: 4/15/2022  Patient seen for: 23 sessions      Visit Diagnoses:     ICD-10-CM ICD-9-CM   1. Low back pain, unspecified back pain laterality, unspecified chronicity, unspecified whether sciatica present  M54.50 724.2   2. Cervical pain  M54.2 723.1   3. Strain of neck muscle, subsequent encounter  S16.1XXD V58.89     847.0       Subjective   Pedrito Naylor reports: that he is doing OK today, he has been off of work since yesterday. He has been continuing with heat, which has helped and has reduced the amount of ibuprofen he has been taking. Says he is mostly fatigued from managing his kids while his wife was out of town. Says he has been in contact with his insurance and MD to determine approval for MRI and yearly PT visit cap.    Pain Level (0-10): 5-6 low back     Objective     See Exercise, Manual, and Modality Logs for complete treatment.     Patient Education: Discussed progress in PT, and need to increase strengthening exercises. Exercise mechanics.      Assessment & Plan     Assessment    Assessment details: Pedrito presents to therapy with reports of slight reduction in pain overall. He demonstrates improving mechanics with PPT/TrA contraction throughout session, however intermittent cues for form are required. Endurance is limited and core stability is only able to maintained for 1 rep max with dynamic core strengthening.       Progress per Plan of Care and Progress strengthening /stabilization /functional activity     See x 1 more session, 30 Day progress note on . Possible continuation v. return to MD for further evaluation v. DC due to lack of progress/conservation of insurance therapy coverage.            Timed:         Manual Therapy:         mins  15801;     Therapeutic Exercise:    23     mins  87159;      Neuromuscular Tripp:        mins  12607;    Therapeutic Activity:     15     mins  90521;     Gait Training:           mins  07475;     Ultrasound:          mins  44754;    Ionto                                   mins   35886  Self Care                            mins   88271      Un-Timed:  Electrical Stimulation:    15     mins  76406 ( );  Traction          mins 75468      Timed Treatment:   38   mins   Total Treatment:     53   mins      Jazzmine Hatfield PTA  Physical Therapist Assistant  [place] License: [id number]

## 2022-04-18 ENCOUNTER — TREATMENT (OUTPATIENT)
Dept: PHYSICAL THERAPY | Facility: CLINIC | Age: 37
End: 2022-04-18

## 2022-04-18 DIAGNOSIS — M54.2 CERVICAL PAIN: ICD-10-CM

## 2022-04-18 DIAGNOSIS — M54.50 LOW BACK PAIN, UNSPECIFIED BACK PAIN LATERALITY, UNSPECIFIED CHRONICITY, UNSPECIFIED WHETHER SCIATICA PRESENT: Primary | ICD-10-CM

## 2022-04-18 PROCEDURE — 97110 THERAPEUTIC EXERCISES: CPT | Performed by: PHYSICAL THERAPIST

## 2022-04-18 PROCEDURE — 97530 THERAPEUTIC ACTIVITIES: CPT | Performed by: PHYSICAL THERAPIST

## 2022-04-18 PROCEDURE — 97014 ELECTRIC STIMULATION THERAPY: CPT | Performed by: PHYSICAL THERAPIST
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
## 2022-04-19 ENCOUNTER — TELEPHONE (OUTPATIENT)
Dept: FAMILY MEDICINE CLINIC | Facility: CLINIC | Age: 37
End: 2022-04-19

## 2022-04-19 DIAGNOSIS — M54.50 CHRONIC BILATERAL LOW BACK PAIN WITHOUT SCIATICA: Primary | ICD-10-CM

## 2022-04-19 DIAGNOSIS — G89.29 CHRONIC BILATERAL LOW BACK PAIN WITHOUT SCIATICA: Primary | ICD-10-CM

## 2022-04-26 ENCOUNTER — APPOINTMENT (OUTPATIENT)
Dept: MRI IMAGING | Facility: HOSPITAL | Age: 37
End: 2022-04-26
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
## 2022-04-27 ENCOUNTER — OFFICE VISIT (OUTPATIENT)
Dept: NEUROSURGERY | Facility: CLINIC | Age: 37
End: 2022-04-27

## 2022-04-27 ENCOUNTER — HOSPITAL ENCOUNTER (OUTPATIENT)
Dept: GENERAL RADIOLOGY | Facility: HOSPITAL | Age: 37
Discharge: HOME | End: 2022-04-27
Admitting: NURSE PRACTITIONER

## 2022-04-27 VITALS
WEIGHT: 212 LBS | SYSTOLIC BLOOD PRESSURE: 148 MMHG | HEART RATE: 64 BPM | OXYGEN SATURATION: 99 % | BODY MASS INDEX: 32.13 KG/M2 | TEMPERATURE: 97.5 F | DIASTOLIC BLOOD PRESSURE: 92 MMHG | RESPIRATION RATE: 16 BRPM | HEIGHT: 68 IN

## 2022-04-27 DIAGNOSIS — G89.29 CHRONIC RIGHT-SIDED LOW BACK PAIN WITHOUT SCIATICA: ICD-10-CM

## 2022-04-27 DIAGNOSIS — M54.50 CHRONIC RIGHT-SIDED LOW BACK PAIN WITHOUT SCIATICA: ICD-10-CM

## 2022-04-27 DIAGNOSIS — G89.29 CHRONIC RIGHT-SIDED LOW BACK PAIN WITHOUT SCIATICA: Primary | ICD-10-CM

## 2022-04-27 DIAGNOSIS — M54.2 NECK PAIN: ICD-10-CM

## 2022-04-27 DIAGNOSIS — M79.18 MUSCULOSKELETAL PAIN: ICD-10-CM

## 2022-04-27 DIAGNOSIS — M54.50 CHRONIC RIGHT-SIDED LOW BACK PAIN WITHOUT SCIATICA: Primary | ICD-10-CM

## 2022-04-27 PROCEDURE — 99204 OFFICE O/P NEW MOD 45 MIN: CPT | Performed by: NURSE PRACTITIONER

## 2022-04-27 PROCEDURE — 72114 X-RAY EXAM L-S SPINE BENDING: CPT

## 2022-04-27 RX ORDER — METHYLPREDNISOLONE 4 MG/1
TABLET ORAL
Qty: 1 EACH | Refills: 0 | Status: SHIPPED | OUTPATIENT
Start: 2022-04-27 | End: 2022-06-21

## 2022-04-27 RX ORDER — CYCLOBENZAPRINE HCL 10 MG
10 TABLET ORAL NIGHTLY PRN
Qty: 30 TABLET | Refills: 1 | Status: SHIPPED | OUTPATIENT
Start: 2022-04-27

## 2022-04-29 ENCOUNTER — PATIENT ROUNDING (BHMG ONLY) (OUTPATIENT)
Dept: NEUROSURGERY | Facility: CLINIC | Age: 37
End: 2022-04-29

## 2022-06-03 ENCOUNTER — TELEPHONE (OUTPATIENT)
Dept: NEUROSURGERY | Facility: CLINIC | Age: 37
End: 2022-06-03

## 2022-06-03 NOTE — TELEPHONE ENCOUNTER
Spoke with Pt. Wife per his verbal release consent form and gave her the Xray results and she also wanted me to let nikita know that the Pt. Knot that he's had since December hasn't gone away but Pt. Does think that it might have went down some.  06/03/22 [facility]

## 2022-06-20 PROBLEM — Z78.9 NEVER SMOKED ANY SUBSTANCE: Status: ACTIVE | Noted: 2017-01-19

## 2022-06-20 PROBLEM — E66.9 OBESITY: Status: ACTIVE | Noted: 2017-01-19

## 2022-06-20 PROBLEM — H92.03 OTALGIA, BILATERAL: Status: ACTIVE | Noted: 2017-01-19
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
## 2022-06-21 ENCOUNTER — OFFICE VISIT (OUTPATIENT)
Dept: NEUROSURGERY | Facility: CLINIC | Age: 37
End: 2022-06-21

## 2022-06-21 VITALS
DIASTOLIC BLOOD PRESSURE: 91 MMHG | TEMPERATURE: 98.4 F | WEIGHT: 212 LBS | OXYGEN SATURATION: 99 % | HEIGHT: 68 IN | RESPIRATION RATE: 18 BRPM | HEART RATE: 84 BPM | BODY MASS INDEX: 32.13 KG/M2 | SYSTOLIC BLOOD PRESSURE: 138 MMHG

## 2022-06-21 DIAGNOSIS — M54.50 CHRONIC RIGHT-SIDED LOW BACK PAIN WITHOUT SCIATICA: Primary | ICD-10-CM

## 2022-06-21 DIAGNOSIS — G89.29 CHRONIC RIGHT-SIDED LOW BACK PAIN WITHOUT SCIATICA: Primary | ICD-10-CM

## 2022-06-21 PROCEDURE — 99214 OFFICE O/P EST MOD 30 MIN: CPT | Performed by: NURSE PRACTITIONER

## 2022-06-21 RX ORDER — METHYLPREDNISOLONE 4 MG/1
TABLET ORAL
Qty: 1 EACH | Refills: 0 | Status: SHIPPED | OUTPATIENT
Start: 2022-06-21